# Patient Record
Sex: FEMALE | Race: OTHER | NOT HISPANIC OR LATINO
[De-identification: names, ages, dates, MRNs, and addresses within clinical notes are randomized per-mention and may not be internally consistent; named-entity substitution may affect disease eponyms.]

---

## 2020-08-27 ENCOUNTER — APPOINTMENT (OUTPATIENT)
Dept: COLORECTAL SURGERY | Facility: CLINIC | Age: 70
End: 2020-08-27
Payer: MEDICARE

## 2020-08-27 VITALS
HEART RATE: 87 BPM | HEIGHT: 61 IN | BODY MASS INDEX: 27.87 KG/M2 | DIASTOLIC BLOOD PRESSURE: 88 MMHG | TEMPERATURE: 98.1 F | SYSTOLIC BLOOD PRESSURE: 198 MMHG | OXYGEN SATURATION: 100 % | WEIGHT: 147.63 LBS

## 2020-08-27 DIAGNOSIS — K63.5 POLYP OF COLON: ICD-10-CM

## 2020-08-27 DIAGNOSIS — D55.0 ANEMIA DUE TO GLUCOSE-6-PHOSPHATE DEHYDROGENASE [G6PD] DEFICIENCY: ICD-10-CM

## 2020-08-27 DIAGNOSIS — R63.4 ABNORMAL WEIGHT LOSS: ICD-10-CM

## 2020-08-27 DIAGNOSIS — K62.5 HEMORRHAGE OF ANUS AND RECTUM: ICD-10-CM

## 2020-08-27 PROBLEM — Z00.00 ENCOUNTER FOR PREVENTIVE HEALTH EXAMINATION: Status: ACTIVE | Noted: 2020-08-27

## 2020-08-27 PROCEDURE — 46600 DIAGNOSTIC ANOSCOPY SPX: CPT

## 2020-08-27 PROCEDURE — 99205 OFFICE O/P NEW HI 60 MIN: CPT

## 2020-08-27 NOTE — PHYSICAL EXAM
[None] : no anal fissures seen [Nonprolapsing] : a nonprolapsing (grade I) [Normal] : was normal [Normal Breath Sounds] : Normal breath sounds [Normal Heart Sounds] : normal heart sounds [2+] : left 2+ [No Rash or Lesion] : No rash or lesion [Oriented to Person] : oriented to person [Alert] : alert [Oriented to Place] : oriented to place [Oriented to Time] : oriented to time [Excoriation] : no perianal excoriation [Abdomen Tenderness] : ~T No ~M abdominal tenderness [Abdomen Masses] : No abdominal masses [Wart] : no warts [Fistula] : no fistulas [Ulcer ___ cm] : no ulcers [Tender, Swollen] : nontender, non-swollen [Skin Tags] : there were no residual hemorrhoidal skin tags seen [Stool Sample Taken] : no stool obtained on rectal exam [Thrombosed] : that was not thrombosed [JVD] : no jugular venous distention  [Thyroid] : the thyroid was abnormal [Gross Blood] : no gross blood [de-identified] : benign, no masses, no distension, no tenderess [de-identified] : small skin tags, small decubitus type ulceration - very minor and early 4 cm from penny, no evidence of fistula [Carotid Bruits] : no carotid bruits [de-identified] : anoscopy: grade 1 hemorrhodis, no fissure mucosa normal [de-identified] : digital: WNL, no masses, tone ok [de-identified] : NAD

## 2020-08-27 NOTE — HISTORY OF PRESENT ILLNESS
[FreeTextEntry1] : (Brice Wilson  is  [phone], last seen in February.  First visit with this MD was January 2020)\par Renal MD started in March, Marcela Mccarty, [address], has seen this MD x 1)\par [place]:  GI\par \par 69 year old woman with anemia (hct 21.9 8/20/20) who had pedunculated polyp removed in 1 piece 2/25/20.  Carcinoma in situ with free margins.  Thought to be fully removed. CEA 1.2.  BUN / creatinine 54/3.28.\par Eating normally but since intermediate eats less.  Had had BRBPR pre colonoscopy but none since TFC.  No melena.  Has had weight loss of 40 lbs /5 yr period.  Not more active and wasn’t trying to loose weight.  No abdominal pain.  No N/V.  \par Family hx negative for colorectal or other cancer.  Mom had HTN.  No CAD hx. \par \par First discovered renal issues recently.  No oliguria. No pain or bleeding with urination. \par \par Saw an GYN MD over 10 years ago and had PAP smear.\par No CT scans done.  \par \par Meds: \par syntrhoid 100 mg/day\par valsartan 160 mg\par nifedipine\par furosemide 20 mg/day\par \par Allergies: none\par \par PSH:\par C section\par no other\par \par PMH\par cardiac: HTN x 15 years at least.  Never had angina or MI.  Does get palpitations.  \par never had stress test (was advised in past but never had it). Can walk 10 blocks if pressed.  Walks few blocks per day.  Lives on 4th floor.  Can climb but rests between floors.  \par pulmonary: no asthma, bronchitis, not a smoker (never smoker)\par no DM\par : no hx of UTI, renal stones, no pain or bleeding with urination\par GYN: no vaginal bleeding      not been seen x > 10 years\par no easy bruising or bleeding\par Hyperactive thyroid hx treated with medication, the swelling resolved mysteriously\par urinates normal volumes

## 2020-08-27 NOTE — ASSESSMENT
[FreeTextEntry1] : Repeat C scope to check on polyp site.  Small chance that it was not fully removed and that residual polyp remains and is bleeding.\par EGD also needed, in my opinion. Sent  Dr. Swanson text about getting these tests.  \par Needs CXR pa and lateral and also Abdominal and pelvic CT with po contrast only.  \par \par Renal failure:  Seen renal MD.  Had renal USG in March.  Non-oliguric.  Chronic renal failure.  K+ 4.7.   Will see renal MD soon (been 2 months)\par \par Had mammograms in January 2020\par \par Need GYN evaluation. \par \par Tried to order CT abdomen andpelvis but it was not allowed based on her hx and current problems (aneia and weight loss).  \par \par

## 2020-08-31 ENCOUNTER — LABORATORY RESULT (OUTPATIENT)
Age: 70
End: 2020-08-31

## 2020-08-31 ENCOUNTER — OUTPATIENT (OUTPATIENT)
Dept: OUTPATIENT SERVICES | Facility: HOSPITAL | Age: 70
LOS: 1 days | End: 2020-08-31
Payer: MEDICARE

## 2020-08-31 PROCEDURE — 71046 X-RAY EXAM CHEST 2 VIEWS: CPT

## 2020-08-31 PROCEDURE — 71046 X-RAY EXAM CHEST 2 VIEWS: CPT | Mod: 26

## 2020-09-02 ENCOUNTER — APPOINTMENT (OUTPATIENT)
Dept: COLORECTAL SURGERY | Facility: AMBULATORY SURGERY CENTER | Age: 70
End: 2020-09-02
Payer: MEDICARE

## 2020-09-02 PROCEDURE — 45380 COLONOSCOPY AND BIOPSY: CPT

## 2020-09-21 ENCOUNTER — APPOINTMENT (OUTPATIENT)
Dept: GYNECOLOGIC ONCOLOGY | Facility: CLINIC | Age: 70
End: 2020-09-21
Payer: MEDICARE

## 2020-09-21 DIAGNOSIS — D64.9 ANEMIA, UNSPECIFIED: ICD-10-CM

## 2020-10-07 ENCOUNTER — APPOINTMENT (OUTPATIENT)
Dept: GYNECOLOGIC ONCOLOGY | Facility: CLINIC | Age: 70
End: 2020-10-07
Payer: MEDICARE

## 2020-10-07 VITALS
OXYGEN SATURATION: 97 % | DIASTOLIC BLOOD PRESSURE: 84 MMHG | HEART RATE: 96 BPM | WEIGHT: 146 LBS | BODY MASS INDEX: 27.56 KG/M2 | SYSTOLIC BLOOD PRESSURE: 144 MMHG | HEIGHT: 61 IN

## 2020-10-07 DIAGNOSIS — R18.8 OTHER ASCITES: ICD-10-CM

## 2020-10-07 PROCEDURE — 99205 OFFICE O/P NEW HI 60 MIN: CPT

## 2020-10-07 NOTE — OB HISTORY
[Total Preg ___] : : [unfilled] [Full Term ___] : [unfilled] (full-term) [ ___] : [unfilled]  section delivery(s) [AB Spont ___] : [unfilled] miscarriage(s)

## 2020-10-09 LAB — HPV HIGH+LOW RISK DNA PNL CVX: NOT DETECTED

## 2020-11-13 NOTE — DISCUSSION/SUMMARY
[FreeTextEntry1] : [ ] ascites and enlarged uterus on exam, ordered for CT abd/pelvis\par [ ] denies PMB\par [ ] pap smear w/ HPV performed today\par [ ] f/u after CT

## 2020-11-13 NOTE — PHYSICAL EXAM
[Distended] : distended [Soft, Nontender] : the abdomen was soft and nontender [None] : no CVA tenderness [Abnormal] : Uterus: Abnormal [de-identified] : fluid wave+ [de-identified] : enlarged mobile uterus

## 2020-11-13 NOTE — HISTORY OF PRESENT ILLNESS
[FreeTextEntry1] : Problem List \par 1. Anemia and weight loss\par \par 69 y/o referred from Dr. Bartlett for gyn evaluation given recent weight loss and chronic anemia. \par Patient had a polyp removed 20 for which Dr. Bartlett has been following. Given continued anemia and weight loss and has not seen gyn provider in many years, was referred to office for GYN evaluation. Patient denies any post menopausal bleeding. Reports she lost about 40lbs from  till  but has not lost any weight in the last 6 months. States weight in 2020 was about 140lb. Denies any symptoms of anemia. Denies any rectal bleeding. Unsure of last gyn appointment, thinks it could be more than 20 years ago. \par \par \par Obhx: \par -c/s x1 , MAB x1\par Gynhx: reports hx of fibroids, denies any hx of abnormal pap, denies PMB\par Mhx: HTN, hypothyroid, Anemia, Colon Polyps, Kidney Disease- followed by Nephrologist at [facility] \par SHx: C/S x1 \par Meds: Synthroid, Valsartan, Nifedipine, Furosemide\par NKDA \par

## 2021-03-24 ENCOUNTER — NON-APPOINTMENT (OUTPATIENT)
Age: 71
End: 2021-03-24

## 2021-03-25 ENCOUNTER — NON-APPOINTMENT (OUTPATIENT)
Age: 71
End: 2021-03-25

## 2021-03-26 ENCOUNTER — NON-APPOINTMENT (OUTPATIENT)
Age: 71
End: 2021-03-26

## 2021-04-02 DIAGNOSIS — R19.00 INTRA-ABDOMINAL AND PELVIC SWELLING, MASS AND LUMP, UNSPECIFIED SITE: ICD-10-CM
